# Patient Record
Sex: MALE
[De-identification: names, ages, dates, MRNs, and addresses within clinical notes are randomized per-mention and may not be internally consistent; named-entity substitution may affect disease eponyms.]

---

## 2018-11-13 ENCOUNTER — HOSPITAL ENCOUNTER (OUTPATIENT)
Dept: HOSPITAL 14 - H.ER | Age: 78
Setting detail: OBSERVATION
LOS: 1 days | Discharge: HOME | End: 2018-11-14
Attending: GENERAL ACUTE CARE HOSPITAL | Admitting: GENERAL ACUTE CARE HOSPITAL
Payer: MEDICARE

## 2018-11-13 DIAGNOSIS — E87.5: ICD-10-CM

## 2018-11-13 DIAGNOSIS — F41.9: ICD-10-CM

## 2018-11-13 DIAGNOSIS — R55: Primary | ICD-10-CM

## 2018-11-13 DIAGNOSIS — S00.01XA: ICD-10-CM

## 2018-11-13 DIAGNOSIS — I10: ICD-10-CM

## 2018-11-13 DIAGNOSIS — Z87.891: ICD-10-CM

## 2018-11-13 DIAGNOSIS — N32.81: ICD-10-CM

## 2018-11-13 DIAGNOSIS — E11.51: ICD-10-CM

## 2018-11-13 DIAGNOSIS — Z23: ICD-10-CM

## 2018-11-13 DIAGNOSIS — S01.01XA: ICD-10-CM
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
## 2018-11-13 DIAGNOSIS — N40.1: ICD-10-CM

## 2018-11-13 LAB
ALBUMIN SERPL-MCNC: 4.5 G/DL (ref 3.5–5)
ALBUMIN/GLOB SERPL: 1.2 {RATIO} (ref 1–2.1)
ALT SERPL-CCNC: 17 U/L (ref 21–72)
APTT BLD: 26 SECONDS (ref 25.6–37.1)
AST SERPL-CCNC: 25 U/L (ref 17–59)
BASOPHILS # BLD AUTO: 0 K/UL (ref 0–0.2)
BASOPHILS NFR BLD: 0.4 % (ref 0–2)
BILIRUB UR-MCNC: NEGATIVE MG/DL
BUN SERPL-MCNC: 22 MG/DL (ref 9–20)
CALCIUM SERPL-MCNC: 9.4 MG/DL (ref 8.4–10.2)
COLOR UR: YELLOW
EOSINOPHIL # BLD AUTO: 0.4 K/UL (ref 0–0.7)
EOSINOPHIL NFR BLD: 9.6 % (ref 0–4)
ERYTHROCYTE [DISTWIDTH] IN BLOOD BY AUTOMATED COUNT: 14.6 % (ref 11.5–14.5)
GFR NON-AFRICAN AMERICAN: 59
GLUCOSE UR STRIP-MCNC: (no result) MG/DL
HGB BLD-MCNC: 15.2 G/DL (ref 12–18)
INR PPP: 1
LEUKOCYTE ESTERASE UR-ACNC: (no result) LEU/UL
LYMPHOCYTES # BLD AUTO: 1.6 K/UL (ref 1–4.3)
LYMPHOCYTES NFR BLD AUTO: 37.6 % (ref 20–40)
MCH RBC QN AUTO: 31.5 PG (ref 27–31)
MCHC RBC AUTO-ENTMCNC: 33 G/DL (ref 33–37)
MCV RBC AUTO: 95.3 FL (ref 80–94)
MONOCYTES # BLD: 0.5 K/UL (ref 0–0.8)
MONOCYTES NFR BLD: 10.9 % (ref 0–10)
NEUTROPHILS # BLD: 1.8 K/UL (ref 1.8–7)
NEUTROPHILS NFR BLD AUTO: 41.5 % (ref 50–75)
NRBC BLD AUTO-RTO: 0.2 % (ref 0–0)
PH UR STRIP: 7 [PH] (ref 5–8)
PLATELET # BLD: 174 K/UL (ref 130–400)
PMV BLD AUTO: 7.9 FL (ref 7.2–11.7)
PROT UR STRIP-MCNC: NEGATIVE MG/DL
PROTHROMBIN TIME: 11.2 SECONDS (ref 9.8–13.1)
RBC # BLD AUTO: 4.82 MIL/UL (ref 4.4–5.9)
RBC # UR STRIP: NEGATIVE /UL
SP GR UR STRIP: 1.01 (ref 1–1.03)
URINE CLARITY: CLEAR
UROBILINOGEN UR-MCNC: (no result) MG/DL (ref 0.2–1)
WBC # BLD AUTO: 4.3 K/UL (ref 4.8–10.8)

## 2018-11-13 PROCEDURE — 71045 X-RAY EXAM CHEST 1 VIEW: CPT

## 2018-11-13 PROCEDURE — 80361 OPIATES 1 OR MORE: CPT

## 2018-11-13 PROCEDURE — 82948 REAGENT STRIP/BLOOD GLUCOSE: CPT

## 2018-11-13 PROCEDURE — 36415 COLL VENOUS BLD VENIPUNCTURE: CPT

## 2018-11-13 PROCEDURE — 85730 THROMBOPLASTIN TIME PARTIAL: CPT

## 2018-11-13 PROCEDURE — 80358 DRUG SCREENING METHADONE: CPT

## 2018-11-13 PROCEDURE — 80345 DRUG SCREENING BARBITURATES: CPT

## 2018-11-13 PROCEDURE — 83992 ASSAY FOR PHENCYCLIDINE: CPT

## 2018-11-13 PROCEDURE — 84484 ASSAY OF TROPONIN QUANT: CPT

## 2018-11-13 PROCEDURE — 96372 THER/PROPH/DIAG INJ SC/IM: CPT

## 2018-11-13 PROCEDURE — 80349 CANNABINOIDS NATURAL: CPT

## 2018-11-13 PROCEDURE — 93005 ELECTROCARDIOGRAM TRACING: CPT

## 2018-11-13 PROCEDURE — 70450 CT HEAD/BRAIN W/O DYE: CPT

## 2018-11-13 PROCEDURE — 80324 DRUG SCREEN AMPHETAMINES 1/2: CPT

## 2018-11-13 PROCEDURE — 85610 PROTHROMBIN TIME: CPT

## 2018-11-13 PROCEDURE — 85025 COMPLETE CBC W/AUTO DIFF WBC: CPT

## 2018-11-13 PROCEDURE — 80053 COMPREHEN METABOLIC PANEL: CPT

## 2018-11-13 PROCEDURE — 93880 EXTRACRANIAL BILAT STUDY: CPT

## 2018-11-13 PROCEDURE — 80320 DRUG SCREEN QUANTALCOHOLS: CPT

## 2018-11-13 PROCEDURE — 72125 CT NECK SPINE W/O DYE: CPT

## 2018-11-13 PROCEDURE — 90471 IMMUNIZATION ADMIN: CPT

## 2018-11-13 PROCEDURE — 80353 DRUG SCREENING COCAINE: CPT

## 2018-11-13 PROCEDURE — 99285 EMERGENCY DEPT VISIT HI MDM: CPT

## 2018-11-13 PROCEDURE — 93306 TTE W/DOPPLER COMPLETE: CPT

## 2018-11-13 PROCEDURE — 80346 BENZODIAZEPINES1-12: CPT

## 2018-11-13 PROCEDURE — 81003 URINALYSIS AUTO W/O SCOPE: CPT

## 2018-11-13 PROCEDURE — 90715 TDAP VACCINE 7 YRS/> IM: CPT

## 2018-11-13 RX ADMIN — GLIPIZIDE SCH MG: 10 TABLET, FILM COATED, EXTENDED RELEASE ORAL at 20:40

## 2018-11-13 RX ADMIN — CILOSTAZOL SCH MG: 100 TABLET ORAL at 20:46

## 2018-11-13 RX ADMIN — INSULIN LISPRO SCH UNITS: 100 INJECTION, SOLUTION INTRAVENOUS; SUBCUTANEOUS at 22:27

## 2018-11-13 NOTE — CT
Date of service: 



11/13/2018



PROCEDURE:  CT Cervical Spine without contrast



HISTORY:

trauma r/o fx



COMPARISON:

None available.



TECHNIQUE:

Axial computed tomography images were obtained of the cervical spine 

without the use of intravenous contrast. Coronal and sagittal 

reformatted images were created and reviewed.



Radiation dose:



Total exam DLP = 362.57 mGy-cm.



This CT exam was performed using one or more of the following dose 

reduction techniques: Automated exposure control, adjustment of the 

mA and/or kV according to patient size, and/or use of iterative 

reconstruction technique.



FINDINGS:



VERTEBRAE:

No fracture. Normal alignment. No destructive bony lesion.



Severe diffuse idiopathic skeletal hyperostosis (DISH) affecting the 

entire cervical spine.



DISCS/SPINAL CANAL/NEURAL FORAMINA:

No significant central canal or neural foraminal stenosis. 

Hyperostotic changes are seen at multiple levels as are disc 

degenerative changes primarily disc space narrowing.



PARASPINAL SOFT TISSUES:

Unremarkable. 



OTHER FINDINGS:

None.



IMPRESSION:

No acute findings related to/accounting  for the clinical 

presentation.



Additional benign and/or incidental findings described above.

## 2018-11-13 NOTE — ED PDOC
HPI: Trauma/Fall





- HPI


Time Seen by Provider: 11/13/18 15:00


Chief Complaint (Nursing): Trauma


Chief Complaint (Provider): Trauma


History Per: Patient, Family,  ([id number])


History/Exam Limitations: clinical condition


Onset/Duration Of Symptoms: Other (PTA)


Location Of Injury: Right: Head


Additional Complaint(s): 





78 years old male with history of hypertension and diabetes brought to ER by EMS

for evaluation of a fall with likely syncopal episode prior to arrival. Patient 

reports he fell on his way to his doctor's office but he is amnestic to all 

events, unable to recall if the incident happened while he was going to his 

doctor's office or if he was on his way to home. In ER, patient is awake and 

alert. Family arrived later and confirmed he is amnestic to all events, unsure 

if he had a mechanical fall or a syncopal episode. Patient has visible right 

sided head trauma and denies headaches, focal weakness, nausea or change in 

vision. He states he is compliant on diabetes medication.





PMD: Sabas Recinos


Discussed w Dr Recinos, patient never made it to the office today. 





NIHSS Stroke Scale





- Date/Time Evaluation Performed


Date Performed: 11/13/18


Time Performed: 15:05


When Was NIHSS Performed: Baseline





- How Severe is the Stroke


Level of Consciousness: 0=Alert


LOC to Questions: 1=One correct


LOC to commands: 0=Obeys both correctly


Best Gaze: 0=Normal


Visual: 0=No visual loss


Facial: 0=Normal


Motor Arm - Left: 0=No drift


Motor Arm - Right: 0=No drift


Motor Leg - Left: 0=No drift


Motor Leg - Right: 0=No drift


Limb Ataxia: 0=Absent


Sensory: 0=Normal


Best Language: 0=No aphasia


Dysarthia: 0=Normal articulation


Extinction & Inattention (Neglect): 0=Normal, no object


Score: 1





rTPA Inclusion/Exclusion





- Refusal of Treatment


Patient Refused Treatment: No





- Inclusion Criteria for Altepase


Patient is 18 years or Older: Yes


The Clinical Diagnosis of Ischemic Stroke That is Causing a Potentially 

Disabling Neurological Deficit: No


Time of Onset is Well Established to be Less Than 270 Minute Before Treatment 

Would Begin: No


Risk/Benefit Discussed With Patient/Family Member Present: No





- Warning to TPA With Conditions


Condition: Stroke Serevity Too Mild (head trauma with scalp hematoma)





Past Medical History


Reviewed: Historical Data, Nursing Documentation, Vital Signs


Vital Signs: 





                                Last Vital Signs











Temp  98.2 F   11/13/18 14:51


 


Pulse  65   11/13/18 14:51


 


Resp  18   11/13/18 14:51


 


BP  153/68 H  11/13/18 14:51


 


Pulse Ox  98   11/13/18 14:51














- Medical History


PMH: Anxiety, Diabetes, HTN





- Surgical History


Surgical History: No Surg Hx





- Family History


Family History: States: Unknown Family Hx





- Social History


Current smoker - smoking cessation education provided: No


Alcohol: None


Drugs: Denies





- Home Medications


Home Medications: 


                                Ambulatory Orders











 Medication  Instructions  Recorded


 


RX: Aspirin [Ecotrin] 81 mg PO DAILY 11/13/18


 


RX: Carvedilol [Coreg] 25 mg PO Q12 11/13/18


 


RX: Cilostazol [Pletal] 100 mg PO Q12 11/13/18


 


RX: Enalapril Maleate [Vasotec] 10 mg PO DAILY 11/13/18


 


RX: Finasteride [Proscar] 5 mg PO DAILY 11/13/18


 


RX: Furosemide [Lasix] 20 mg PO DAILY 11/13/18


 


RX: Glimepiride [amaRYL] 4 mg PO BID 11/13/18


 


RX: Insulin Glargine, Recombina 40 unit SC DAILY 11/13/18





[Lantus]  


 


RX: Sitagliptin Phos/Metformin HCl 1 tab PO BID 11/13/18





[Janumet 50-1,000 mg Tablet]  


 


RX: Solifenacin Succinate 5 mg PO DAILY 11/13/18





[Vesicare]  


 


RX: Temazepam [Restoril] 15 mg PO HS PRN 11/13/18


 


RX: Verapamil HCl [Verapamil Sr] 240 mg PO DAILY 11/13/18














- Allergies


Allergies/Adverse Reactions: 


                                    Allergies











Allergy/AdvReac Type Severity Reaction Status Date / Time


 


No Known Allergies Allergy   Verified 11/13/18 14:54














Review of Systems


Review Of Systems: ROS cannot be obtained secondary to pt's inabilty to answer 

questions.





Physical Exam





- Reviewed


Nursing Documentation Reviewed: Yes


Vital Signs Reviewed: Yes





- Physical Exam


Appears: Positive for: Non-toxic, No Acute Distress


Head Exam: Positive for: NORMOCEPHALIC.  Negative for: ATRAUMATIC (8 cm hematoma

to right scalp with 2 cm abrasion.)


Skin: Positive for: Normal Color, Warm, Dry


Neck: Positive for: Normal (no tenderness), Painless ROM, Supple


Cardiovascular/Chest: Positive for: Regular Rate, Rhythm.  Negative for: Murmur


Respiratory: Positive for: Normal Breath Sounds.  Negative for: Wheezing


Gastrointestinal/Abdominal: Positive for: Normal Exam, Soft.  Negative for: 

Tenderness


Neurologic/Psych: Positive for: Alert, Oriented, Other (Speech clear in Portuguese.

Strength equal semteric 5/5)





- Laboratory Results


Result Diagrams: 


                                 11/13/18 15:27





                                 11/13/18 15:27





- ECG


ECG: Positive for: Interpreted By Me


ECG Rhythm: Positive for: Sinus Rhythm, Nonspecific Changes


Rate: 69


O2 Sat by Pulse Oximetry: 98 (RA)


Pulse Ox Interpretation: Normal





Medical Decision Making


Medical Decision Making: 





Time: 1514


Initial plan:


--Workup for fall or syncope with visible head trauma and evidence of 

concussion.


--CT cervical spine


--CT head w/o contrast


--Chest x-ray


--Tetanus 0.5 ml IM


--Urinalysis


--EKG





1538


CXR FINDINGS:





LUNGS:


The lungs are well inflated and clear.





PLEURA:


No pleural effusions or pneumothorax. 





CARDIOVASCULAR:


The heart is normal in size.  No aortic atherosclerotic calcification present. 





OSSEOUS STRUCTURES:


Within normal limits for the patient's age.





VISUALIZED UPPER ABDOMEN:


Normal.





OTHER FINDINGS:


None.





IMPRESSION:


No active pulmonary disease.








-----------





labs and EKG reviewed by writer





Patient remains w periods of confusion and not at baseline per daughter, to plac

e Obs for AMS and head trauma.


Dr Recinos aware, admit Obs hospitalist











-----------------------

--------------------------------------------------------------


Scribe Attestation:


Documented by Karon Haney, acting as a scribe for Bjorn Peoples MD.





Provider Scribe Attestation:


All medical record entries made by the Scribe were at my direction and 

personally dictated by me. I have reviewed the chart and agree that the record 

accurately reflects my personal performance of the history, physical exam, 

medical decision making, and the department course for this patient. I have also

personally directed, reviewed, and agree with the discharge instructions and 

disposition.





Disposition





- Clinical Impression


Clinical Impression: 


 Syncope, Head injury, Concussion








- Patient ED Disposition


Is Patient to be Admitted: Yes


Counseled Patient/Family Regarding: Studies Performed, Diagnosis, Need For 

Followup





- Disposition


Disposition Time: 17:15


Condition: STABLE





- Pt Status Changed To:


Hospital Disposition Of: Observation





- POA


Present On Arrival: Falls Or Trauma

## 2018-11-13 NOTE — RAD
Date of service: 



11/13/2018



HISTORY:

 SOB 



COMPARISON:

No prior. 



FINDINGS:



LUNGS:

The lungs are well inflated and clear.



PLEURA:

No pleural effusions or pneumothorax. 



CARDIOVASCULAR:

The heart is normal in size.  No aortic atherosclerotic calcification 

present. 



OSSEOUS STRUCTURES:

Within normal limits for the patient's age.



VISUALIZED UPPER ABDOMEN:

Normal.



OTHER FINDINGS:

None.



IMPRESSION:

No active pulmonary disease.

## 2018-11-13 NOTE — CT
Date of service: 



11/13/2018



PROCEDURE:  CT HEAD WITHOUT CONTRAST.



HISTORY:

r/o ICH



COMPARISON:

The



TECHNIQUE:

Axial computed tomography images were obtained through the head/brain 

without intravenous contrast.  



Supplemental Coronal and Sagittal projections created and reviewed.



Radiation dose:



Total exam DLP = <inf_radiation_dlp> mGy-cm.



This CT exam was performed using one or more of the following dose 

reduction techniques: Automated exposure control, adjustment of the 

mA and/or kV according to patient size, and/or use of iterative 

reconstruction technique.



FINDINGS:



HEMORRHAGE:

No intracranial hemorrhage. 



BRAIN:

No mass effect or edema.  Cortical atrophy and chronic microvascular 

ischemic change. 



VENTRICLES:

Unremarkable. No hydrocephalus. 



CALVARIUM:

Unremarkable.



PARANASAL SINUSES:

Unremarkable as visualized. No significant inflammatory changes.



MASTOID AIR CELLS:

Unremarkable as visualized. No inflammatory changes.



OTHER FINDINGS:

Larger right parietal scalp contusion without underlying calvarial or 

intracranial abnormality. 



IMPRESSION:

Extracranial scalp contusion.



No acute intracranial abnormalities. No significant findings to 

account for the clinical presentation.
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
## 2018-11-14 VITALS — RESPIRATION RATE: 20 BRPM

## 2018-11-14 VITALS — SYSTOLIC BLOOD PRESSURE: 121 MMHG | DIASTOLIC BLOOD PRESSURE: 65 MMHG | TEMPERATURE: 97.2 F

## 2018-11-14 RX ADMIN — INSULIN LISPRO SCH UNITS: 100 INJECTION, SOLUTION INTRAVENOUS; SUBCUTANEOUS at 13:13

## 2018-11-14 RX ADMIN — CILOSTAZOL SCH MG: 100 TABLET ORAL at 13:10

## 2018-11-14 RX ADMIN — GLIPIZIDE SCH MG: 10 TABLET, FILM COATED, EXTENDED RELEASE ORAL at 13:14

## 2018-11-14 NOTE — CARD
--------------- APPROVED REPORT --------------





Date of service: 11/14/2018



EKG Measurement

Heart [id number]

[place] [id number]

[id number]

[id number]

[id number]



<Conclusion>

Normal sinus rhythm

Normal ECG

## 2018-11-14 NOTE — CARD
--------------- APPROVED REPORT --------------





Date of service: 11/14/2018



EXAM: Two-dimensional and M-mode echocardiogram with Doppler and 

color Doppler.



Other Information 

Quality : AverageRhythm : NSR



INDICATION

Syncope 



2D DIMENSIONS 

IVSd1.22   (0.7-1.1cm)LVDd4.12   (3.9-5.9cm)

LVOT Diameter2.22   (1.8-2.4cm)PWd1.12   (0.7-1.1cm)

IVSs1.39   (0.8-1.2cm)LVDs2.50   (2.5-4.0cm)

FS (%) 39.3   %PWs1.35   (0.8-1.2cm)



M-Mode DIMENSIONS 

Left Atrium (MM)5.13   (2.5-4.0cm)IVSd0.87   (0.7-1.1cm)

Aortic Root3.46   (2.2-3.7cm)LVDd6.29   (4.0-5.6cm)

Aortic Cusp Exc.1.96   (1.5-2.0cm)PWd0.94   (0.7-1.1cm)

IVSs1.75   cmFS (%) 37   %

LVDs3.98   (2.0-3.8cm)PWs1.99   cm



Aortic Valve

AoV Peak Inbpmvxr192.2cm/sAoV VTI20.7cmAO Peak GR.6mmHg

LVOT Peak Eiwltdkh48.0cm/sLVOT VTI16.53cmAO Mean GR.3mmHg

OSWALD (VMAX)1.99sm3OQT (VTI)1.58cm2



Mitral Valve

MV E Nglwfune63.2cm/sMV DECEL [id number] A Vfuhsevd88.7cm/s

MV YLQ33giU/A ratio0.8MVA (PHT)2.75cm2



TDI

Lateral E' Peak V4.56cm/sMedial E' Peak V5.38cm/sE/Lateral E'11.2

E/Medial E'9.5



 LEFT VENTRICLE 

The left ventricle is normal size.

There is mild concentric left ventricular hypertrophy.

The left ventricular systolic function is normal.

The estimated ejection fraction is 55-60%

No regional wall motion abnormalities noted..

Transmitral Doppler flow pattern is Grade I-abnormal relaxation 

pattern.

No left ventricle thrombus noted on this study.

There is no ventricular septal defect visualized.

There is no left ventricular aneurysm.

There is no mass noted in the left ventricle.



 RIGHT VENTRICLE 

The right ventricle is normal size.

There is normal right ventricular wall thickness.

The right ventricular systolic function is normal.



 ATRIA 

The left atrium is moderately dilated.

The right atrium size is normal.



 AORTIC VALVE 

The aortic valve is normal in structure.

No aortic regurgitation is present.

There is no aortic valvular stenosis.

There is no aortic valvular vegetation.



 MITRAL VALVE 

The mitral valve is normal in structure.

There is no evidence of mitral valve prolapse.

There is no mitral valve stenosis.

There is no mitral valve regurgitation noted.



 TRICUSPID VALVE 

The tricuspid valve is normal in structure.

There is no significant tricuspid valve regurgitation noted.

There is no tricuspid valve prolapse or vegetation.

There is no tricuspid valve stenosis.



 PULMONIC VALVE 

The pulmonary valve is normal in structure.

There is no pulmonic valvular regurgitation.

There is no pulmonic valvular stenosis.



 GREAT VESSELS 

The aortic root is normal in size.

The ascending aorta is normal in size.

The pulmonary artery is normal.

The IVC is not visualized



 PERICARDIAL EFFUSION 

There is no pericardial effusion.

There is no pleural effusion.



<Conclusion>

There is mild concentric left ventricular hypertrophy.

The estimated ejection fraction is 55-60%

Transmitral Doppler flow pattern is Grade I-abnormal relaxation 

pattern.

The left atrium is moderately dilated.

There is no significant tricuspid valve regurgitation noted.

## 2018-11-14 NOTE — CARD
--------------- APPROVED REPORT --------------





Date of service: 11/13/2018



EKG Measurement

Heart [id number]

HI [id number]

[id number]

[id number]

[id number]



<Conclusion>

Normal sinus rhythm

Nonspecific ST abnormality

Abnormal ECG

## 2018-11-14 NOTE — CP.PCM.PN
<Sarah Metcalf - Last Filed: 11/14/18 12:38>





Subjective





- Date & Time of Evaluation


Date of Evaluation: 11/14/18


Time of Evaluation: 11:41





- Subjective


Subjective: 





79 yo male patient seen and examined at bedside. Patient is resting comfortably 

in bed and in NAD. No acute events overnight, no further episodes of syncope. He

denies any pain to abrasion site.  Denies N/V/F/SOB/CP/Chills, denies seizures, 

denies dizziness. 








Objective





- Vital Signs/Intake and Output


Vital Signs (last 24 hours): 


                                        











Temp Pulse Resp BP Pulse Ox


 


 97.7 F   69   20   129/77   94 L


 


 11/14/18 08:20  11/14/18 08:20  11/14/18 08:20  11/14/18 08:20  11/14/18 08:20











- Medications


Medications: 


                               Current Medications





Aspirin (Ecotrin)  81 mg PO DAILY [facility]


Bacitracin (Bacitracin Oint)  1 applic TOP TID [facility]


   Last Admin: 11/13/18 20:42 Dose:  1 applic


Carvedilol (Coreg)  25 mg PO Q12 [facility]


   Last Admin: 11/13/18 20:40 Dose:  25 mg


Cilostazol (Pletal)  100 mg PO Q12 [facility]


   Last Admin: 11/13/18 20:46 Dose:  100 mg


Dextrose (Dextrose 50% Inj)  0 ml IV STAT PRN; Protocol


   PRN Reason: Hypoglycemia Protocol


Dextrose (Glutose 15)  0 gm PO ONCE PRN; Protocol


   PRN Reason: Hypoglycemia Protocol


Enalapril Maleate (Vasotec)  10 mg PO DAILY [facility]


Finasteride (Proscar)  5 mg PO DAILY [facility]


Furosemide (Lasix)  20 mg PO DAILY [facility]


Glipizide (Glucotrol Xl)  10 mg PO BIDWM [facility]


   Last Admin: 11/13/18 20:40 Dose:  10 mg


Glucagon (Glucagen Diagnostic Kit)  0 mg IM STAT PRN; Protocol


   PRN Reason: Hypoglycemia Protocol


Home Med (Solifenacin Succinate [Vesicare])  5 mg PO DAILY [facility]


Insulin Detemir (Levemir)  40 units SC DAILY [facility]


Insulin Human [facility]  [facility]; Protocol


   Last Admin: 11/13/18 22:27 Dose:  3 units


Meclizine HCl (Antivert)  25 mg PO Q8 PRN


   PRN Reason: Dizziness


Metformin HCl (Glucophage)  1,000 mg PO BID [facility]


   Last Admin: 11/13/18 20:39 Dose:  1,000 mg


Sitagliptin Phosphate (Januvia)  100 mg PO DAILY [facility]


Temazepam (Restoril)  15 mg PO HS PRN


   PRN Reason: Insomnia


Verapamil HCl (Calan Sr Capsule)  240 mg PO DAILY [facility]











- Labs


Labs: 


                                        





                                 11/13/18 15:27 





                                 11/13/18 15:27 





                                        











PT  11.2 Seconds (9.8-13.1)   11/13/18  15:27    


 


INR  1.0   11/13/18  15:27    


 


APTT  26.0 Seconds (25.6-37.1)   11/13/18  15:27    














- Constitutional


Appears: Well, No Acute Distress





- Head Exam


Additional comments: 





Superficial abrasion with mild sero-sanginous drainage and ecchymosis noted to 

the R lateral aspect of scalp





- Eye Exam


Eye Exam: PERRL


Pupil Exam: NORMAL ACCOMODATION, PERRL


Additional comments: 





Ptosis appreciated to L upper eyelid 





- Neck Exam


Neck Exam: Normal Inspection





- Respiratory Exam


Respiratory Exam: Clear to Ausculation Bilateral, NORMAL BREATHING PATTERN





- Cardiovascular Exam


Cardiovascular Exam: REGULAR RHYTHM





- GI/Abdominal Exam


GI & Abdominal Exam: Soft





- Extremities Exam


Extremities Exam: Normal Capillary Refill, Normal Inspection





- Back Exam


Back Exam: NORMAL INSPECTION





- Neurological Exam


Neurological Exam: Alert, Awake





- Psychiatric Exam


Psychiatric exam: Normal Affect, Normal Mood





- Skin


Skin Exam: Warm





Assessment and Plan





- Assessment and Plan (Free Text)


Assessment: 


 79 yo male with PMH of hypertension, DMII, PVD, overactive bladder admitted to 

the [facility] for evaluation and treatment of syncopal episode 





Plan: 





1) Syncopal episode, unknown etiology 


- r/o vasovagal vs cardiac vs neuro vs dehydration vs hypoglycemia 


- Head CT and Cervical CT: No acute changes 


- ECG (11/13): Normal sinus rhythm, non-specific ST abnormality


- Follow up Echo 


- Carotid U/S; No evidence of hemodynamically significant stenosis in the 

internal carotid arteries by peak systolic velocity criteria. Patent b/l 

vertebral arteries with antegrade flow 





2) Right scalp parietal lobe superficial abrasion


- CT head: no acute findings 


- C/w Abrasion care: Bacitracin 





3) Hypertension


- Chronic 


- C/w Home medications: Coreg, Enalapril, Verapamil 





4) DM-II 


- Chronic 


- C/w Home medications


- Lispro/medium dose sliding scale


- Hypoglycemic protocol


- AccuChecks ACHS





5) Hyperkalemia 


- Likely due to chronic conditions and medications use 


- F/U am labs





6) Overactive bladder/BPH


- C/w home medications: Solifenacin and Finasteride 





7) PVD


- C/w Aspirin and Cilostazol 





8) DVT ppx


- SCD for now 








<Gerardo Raya D - Last Filed: 11/14/18 16:07>





Objective





- Vital Signs/Intake and Output


Vital Signs (last 24 hours): 


                                        











Temp Pulse Resp BP Pulse Ox


 


 97.2 F L  81   20   121/65   99 


 


 11/14/18 12:06  11/14/18 12:06  11/14/18 12:06  11/14/18 13:13  11/14/18 12:06











- Medications


Medications: 


                               Current Medications





Aspirin (Ecotrin)  81 mg PO DAILY [facility]


Bacitracin (Bacitracin Oint)  1 applic TOP TID [facility]


   Last Admin: 11/13/18 20:42 Dose:  1 applic


Carvedilol (Coreg)  25 mg PO Q12 [facility]


   Last Admin: 11/14/18 13:14 Dose:  25 mg


Cilostazol (Pletal)  100 mg PO Q12 [facility]


   Last Admin: 11/14/18 13:10 Dose:  100 mg


Dextrose (Dextrose 50% Inj)  0 ml IV STAT PRN; Protocol


   PRN Reason: Hypoglycemia Protocol


Dextrose (Glutose 15)  0 gm PO ONCE PRN; Protocol


   PRN Reason: Hypoglycemia Protocol


Enalapril Maleate (Vasotec)  10 mg PO DAILY [facility]


   Last Admin: 11/14/18 13:14 Dose:  10 mg


Finasteride (Proscar)  5 mg PO DAILY [facility]


   Last Admin: 11/14/18 13:15 Dose:  5 mg


Furosemide (Lasix)  20 mg PO DAILY [facility]


   Last Admin: 11/14/18 13:13 Dose:  20 mg


Glipizide (Glucotrol Xl)  10 mg PO BIDWM [facility]


   Last Admin: 11/14/18 13:14 Dose:  10 mg


Glucagon (Glucagen Diagnostic Kit)  0 mg IM STAT PRN; Protocol


   PRN Reason: Hypoglycemia Protocol


Home Med (Solifenacin Succinate [Vesicare])  5 mg PO DAILY [facility]


Insulin Detemir (Levemir)  40 units SC DAILY [facility]


   Last Admin: 11/14/18 13:11 Dose:  40 unit


Insulin Human Lispro (Humalog)  0 units SC ACHS [facility]; Protocol


   Last Admin: 11/14/18 13:13 Dose:  4 units


Meclizine HCl (Antivert)  25 mg PO Q8 PRN


   PRN Reason: Dizziness


Metformin HCl (Glucophage)  1,000 mg PO BID [facility]


   Last Admin: 11/14/18 13:14 Dose:  1,000 mg


Sitagliptin Phosphate (Januvia)  100 mg PO DAILY [facility]


   Last Admin: 11/14/18 13:12 Dose:  100 mg


Temazepam (Restoril)  15 mg PO HS PRN


   PRN Reason: Insomnia


Verapamil HCl (Calan Sr Capsule)  240 mg PO DAILY [facility]











- Labs


Labs: 


                                        





                                 11/13/18 15:27 





                                 11/13/18 15:27 





                                        











PT  11.2 Seconds (9.8-13.1)   11/13/18  15:27    


 


INR  1.0   11/13/18  15:27    


 


APTT  26.0 Seconds (25.6-37.1)   11/13/18  15:27    














Attending/Attestation





- Attestation


I have personally seen and examined this patient.: Yes


I have fully participated in the care of the patient.: Yes


I have reviewed all pertinent clinical information, including history, physical 

exam and plan: Yes


Notes (Text): 





11/14/18 16:06


Patient seen and examined. Patient's feeling better and appeared better but 

still with slight wheezing. Plan for discharge tomorrow barring untoward events.

## 2018-11-14 NOTE — CP.PCM.DIS
<Sarah Metcalf - Last Filed: 11/14/18 14:40>





Provider





- Provider


Date of Admission: 


11/13/18 18:00





Attending physician: 


Gerardo Raya MD





Time Spent in preparation of Discharge (in minutes): 30





Diagnosis





- Discharge Diagnosis


(1) Syncope


Status: Acute   





Hospital Course





- Lab Results


Lab Results: 


                             Most Recent Lab Values











WBC  4.3 K/uL (4.8-10.8)  L  11/13/18  15:27    


 


RBC  4.82 Mil/uL (4.40-5.90)   11/13/18  15:27    


 


Hgb  15.2 g/dL (12.0-18.0)   11/13/18  15:27    


 


Hct  45.9 % (35.0-51.0)   11/13/18  15:27    


 


MCV  95.3 fl (80.0-94.0)  H  11/13/18  15:27    


 


MCH  31.5 pg (27.0-31.0)  H  11/13/18  15:27    


 


MCHC  33.0 g/dL (33.0-37.0)   11/13/18  15:27    


 


RDW  14.6 % (11.5-14.5)  H  11/13/18  15:27    


 


Plt Count  174 K/uL (130-400)   11/13/18  15:27    


 


MPV  7.9 fl (7.2-11.7)   11/13/18  15:27    


 


Neut % (Auto)  41.5 % (50.0-75.0)  L  11/13/18  15:27    


 


Lymph % (Auto)  37.6 % (20.0-40.0)   11/13/18  15:27    


 


Mono % (Auto)  10.9 % (0.0-10.0)  H  11/13/18  15:27    


 


Eos % (Auto)  9.6 % (0.0-4.0)  H  11/13/18  15:27    


 


Baso % (Auto)  0.4 % (0.0-2.0)   11/13/18  15:27    


 


Neut # (Auto)  1.8 K/uL (1.8-7.0)   11/13/18  15:27    


 


Lymph # (Auto)  1.6 K/uL (1.0-4.3)   11/13/18  15:27    


 


Mono # (Auto)  0.5 K/uL (0.0-0.8)   11/13/18  15:27    


 


Eos # (Auto)  0.4 K/uL (0.0-0.7)   11/13/18  15:27    


 


Baso # (Auto)  0.0 K/uL (0.0-0.2)   11/13/18  15:27    


 


PT  11.2 Seconds (9.8-13.1)   11/13/18  15:27    


 


INR  1.0   11/13/18  15:27    


 


APTT  26.0 Seconds (25.6-37.1)   11/13/18  15:27    


 


Sodium  137 mmol/l (132-148)   11/13/18  15:27    


 


Potassium  5.3 MMOL/L (3.6-5.0)  H  11/13/18  15:27    


 


Chloride  101 mmol/L ()   11/13/18  15:27    


 


Carbon Dioxide  25 mmol/L (22-30)   11/13/18  15:27    


 


Anion Gap  16  (10-20)   11/13/18  15:27    


 


BUN  22 mg/dl (9-20)  H  11/13/18  15:27    


 


Creatinine  1.2 mg/dl (0.8-1.5)   11/13/18  15:27    


 


Est GFR ( Amer)  > 60   11/13/18  15:27    


 


Est GFR (Non-Af Amer)  59   11/13/18  15:27    


 


POC Glucose (mg/dL)  288 mg/dL ()  H  11/14/18  11:45    


 


Random Glucose  275 mg/dL ()  H  11/13/18  15:27    


 


Calcium  9.4 mg/dL (8.4-10.2)   11/13/18  15:27    


 


Total Bilirubin  1.0 mg/dl (0.2-1.3)   11/13/18  15:27    


 


AST  25 U/L (17-59)   11/13/18  15:27    


 


ALT  17 U/L (21-72)  L  11/13/18  15:27    


 


Alkaline Phosphatase  103 U/L ()   11/13/18  15:27    


 


Troponin I  < 0.0120 ng/mL (0.00-0.120)   11/14/18  02:37    


 


Total Protein  8.2 G/DL (6.3-8.2)   11/13/18  15:27    


 


Albumin  4.5 g/dL (3.5-5.0)   11/13/18  15:27    


 


Globulin  3.7 gm/dL (2.2-3.9)   11/13/18  15:27    


 


Albumin/Globulin Ratio  1.2  (1.0-2.1)   11/13/18  15:27    


 


Urine Color  Yellow  (YELLOW)   11/13/18  18:58    


 


Urine Clarity  Clear  (Clear)   11/13/18  18:58    


 


Urine pH  7.0  (5.0-8.0)   11/13/18  18:58    


 


Ur Specific Gravity  1.012  (1.003-1.030)   11/13/18  18:58    


 


Urine Protein  Negative mg/dL (NEGATIVE)   11/13/18  18:58    


 


Urine Glucose (UA)  Neg mg/dL (Normal)   11/13/18  18:58    


 


Urine Ketones  Negative mg/dL (NEGATIVE)   11/13/18  18:58    


 


Urine Blood  Negative  (NEGATIVE)   11/13/18  18:58    


 


Urine Nitrate  Negative  (NEGATIVE)   11/13/18  18:58    


 


Urine Bilirubin  Negative  (NEGATIVE)   11/13/18  18:58    


 


Urine Urobilinogen  0.2-1.0 mg/dL (0.2-1.0)   11/13/18  18:58    


 


Ur Leukocyte Esterase  Neg Kerry/uL (Negative)   11/13/18  18:58    


 


Urine RBC (Auto)  2 /hpf (0-3)   11/13/18  18:58    


 


Urine Opiates Screen  Negative  (NEGATIVE)   11/13/18  18:58    


 


Urine Methadone Screen  Negative  (NEGATIVE)   11/13/18  18:58    


 


Ur Barbiturates Screen  Negative  (NEGATIVE)   11/13/18  18:58    


 


Ur Phencyclidine Scrn  Negative  (NEGATIVE)   11/13/18  18:58    


 


Ur Amphetamines Screen  Negative  (NEGATIVE)   11/13/18  18:58    


 


U Benzodiazepines Scrn  Negative  (NEGATIVE)   11/13/18  18:58    


 


U Oth Cocaine Metabols  Negative  (NEGATIVE)   11/13/18  18:58    


 


U Cannabinoids Screen  Negative  (NEGATIVE)   11/13/18  18:58    


 


Alcohol, Quantitative  < 10 mg/dl (0-10)   11/13/18  15:27    














- Hospital Course


Hospital Course: 


 79 yo male with PMH of hypertension, DMII, PVD, overactive bladder admitted to 

the [facility] for evaluation and treatment of syncopal episode 





Plan: 





1) Syncopal episode, unknown etiology 


- r/o vasovagal vs cardiac vs neuro vs dehydration vs hypoglycemia 


- Head CT and Cervical CT: No acute changes 


- ECG (11/13): Normal sinus rhythm, non-specific ST abnormality


- Echo; Mild concentric L ventricular hypertrophy. The estimated ejection 

fraction is 55-60%. Transmitral doppler flow pattern is Grade 1-abnormal 

relaxation pattern. The L atrium is moderately dilated. There is no significant 

tricuspid valve regurgitation noted. 


- Carotid U/S; No evidence of hemodynamically significant stenosis in the 

internal carotid arteries by peak systolic velocity criteria. Patent b/l 

vertebral arteries with antegrade flow 





2) Right scalp parietal lobe superficial abrasion


- CT head: no acute findings 


- C/w Abrasion care: Bacitracin 





79 yo male with PMH of hypertension, DMII, PVD, overactive bladder admitted to 

the [facility] for evaluation and treatment of syncopal episode with R scalp 

superficial abrasion. Upon admission, full syncope workup unremarkable; patient 

placed on tele monitor, glucose level monitored, no arrhythmas, no signs of 

symptoms of orthostatic blood pressure, serial troponins negative. Scalp 

laceration treated with local wound care. After an uneventful hospital course, 

patient stable for discharge home.  














- Date & Time of H&P


Date of H&P: 11/14/18


Time of H&P: 13:59





Discharge Exam





- Head Exam


Head Exam: NORMOCEPHALIC.  absent: ATRAUMATIC (8 cm hematoma to right scalp with

2 cm abrasion.)


Additional comments: 





Superficial abrasion with mild sero-sanginous drainage and ecchymosis noted to 

the R lateral aspect of scalp








- Eye Exam


Eye Exam: Normal appearance


Pupil Exam: NORMAL ACCOMODATION


Additional comments: 





Ptosis appreciated to L upper eyelid 





- Respiratory Exam


Respiratory Exam: NORMAL BREATHING PATTERN





- Cardiovascular Exam


Cardiovascular Exam: REGULAR RHYTHM





- GI/Abdominal Exam


GI & Abdominal Exam: Unremarkable





- Extremities Exam


Extremities exam: normal capillary refill, normal inspection





- Neurological Exam


Neurological exam: Alert, Oriented x3





- Psychiatric Exam


Psychiatric exam: Normal Affect, Normal Mood





- Skin


Skin Exam: Warm





Discharge Plan





- Follow Up Plan


Condition: STABLE


Disposition: HOME/ ROUTINE


Instructions:  Syncope (Fainting) (DC)


Additional Instructions: 


follow up with pmd in 1 week


Referrals: 


Sabas Recinos MD [Family Provider] - 





<Gerardo Raya - Last Filed: 11/14/18 16:18>





Provider





- Provider


Date of Admission: 


11/13/18 18:00





Attending physician: 


Gerardo Raya MD








Hospital Course





- Lab Results


Lab Results: 


                             Most Recent Lab Values











WBC  4.3 K/uL (4.8-10.8)  L  11/13/18  15:27    


 


RBC  4.82 Mil/uL (4.40-5.90)   11/13/18  15:27    


 


Hgb  15.2 g/dL (12.0-18.0)   11/13/18  15:27    


 


Hct  45.9 % (35.0-51.0)   11/13/18  15:27    


 


MCV  95.3 fl (80.0-94.0)  H  11/13/18  15:27    


 


MCH  31.5 pg (27.0-31.0)  H  11/13/18  15:27    


 


MCHC  33.0 g/dL (33.0-37.0)   11/13/18  15:27    


 


RDW  14.6 % (11.5-14.5)  H  11/13/18  15:27    


 


Plt Count  174 K/uL (130-400)   11/13/18  15:27    


 


MPV  7.9 fl (7.2-11.7)   11/13/18  15:27    


 


Neut % (Auto)  41.5 % (50.0-75.0)  L  11/13/18  15:27    


 


Lymph % (Auto)  37.6 % (20.0-40.0)   11/13/18  15:27    


 


Mono % (Auto)  10.9 % (0.0-10.0)  H  11/13/18  15:27    


 


Eos % (Auto)  9.6 % (0.0-4.0)  H  11/13/18  15:27    


 


Baso % (Auto)  0.4 % (0.0-2.0)   11/13/18  15:27    


 


Neut # (Auto)  1.8 K/uL (1.8-7.0)   11/13/18  15:27    


 


Lymph # (Auto)  1.6 K/uL (1.0-4.3)   11/13/18  15:27    


 


Mono # (Auto)  0.5 K/uL (0.0-0.8)   11/13/18  15:27    


 


Eos # (Auto)  0.4 K/uL (0.0-0.7)   11/13/18  15:27    


 


Baso # (Auto)  0.0 K/uL (0.0-0.2)   11/13/18  15:27    


 


PT  11.2 Seconds (9.8-13.1)   11/13/18  15:27    


 


INR  1.0   11/13/18  15:27    


 


APTT  26.0 Seconds (25.6-37.1)   11/13/18  15:27    


 


Sodium  137 mmol/l (132-148)   11/13/18  15:27    


 


Potassium  5.3 MMOL/L (3.6-5.0)  H  11/13/18  15:27    


 


Chloride  101 mmol/L ()   11/13/18  15:27    


 


Carbon Dioxide  25 mmol/L (22-30)   11/13/18  15:27    


 


Anion Gap  16  (10-20)   11/13/18  15:27    


 


BUN  22 mg/dl (9-20)  H  11/13/18  15:27    


 


Creatinine  1.2 mg/dl (0.8-1.5)   11/13/18  15:27    


 


Est GFR ( Amer)  > 60   11/13/18  15:27    


 


Est GFR (Non-Af Amer)  59   11/13/18  15:27    


 


POC Glucose (mg/dL)  288 mg/dL ()  H  11/14/18  11:45    


 


Random Glucose  275 mg/dL ()  H  11/13/18  15:27    


 


Calcium  9.4 mg/dL (8.4-10.2)   11/13/18  15:27    


 


Total Bilirubin  1.0 mg/dl (0.2-1.3)   11/13/18  15:27    


 


AST  25 U/L (17-59)   11/13/18  15:27    


 


ALT  17 U/L (21-72)  L  11/13/18  15:27    


 


Alkaline Phosphatase  103 U/L ()   11/13/18  15:27    


 


Troponin I  < 0.0120 ng/mL (0.00-0.120)   11/14/18  02:37    


 


Total Protein  8.2 G/DL (6.3-8.2)   11/13/18  15:27    


 


Albumin  4.5 g/dL (3.5-5.0)   11/13/18  15:27    


 


Globulin  3.7 gm/dL (2.2-3.9)   11/13/18  15:27    


 


Albumin/Globulin Ratio  1.2  (1.0-2.1)   11/13/18  15:27    


 


Urine Color  Yellow  (YELLOW)   11/13/18  18:58    


 


Urine Clarity  Clear  (Clear)   11/13/18  18:58    


 


Urine pH  7.0  (5.0-8.0)   11/13/18  18:58    


 


Ur Specific Gravity  1.012  (1.003-1.030)   11/13/18  18:58    


 


Urine Protein  Negative mg/dL (NEGATIVE)   11/13/18  18:58    


 


Urine Glucose (UA)  Neg mg/dL (Normal)   11/13/18  18:58    


 


Urine Ketones  Negative mg/dL (NEGATIVE)   11/13/18  18:58    


 


Urine Blood  Negative  (NEGATIVE)   11/13/18  18:58    


 


Urine Nitrate  Negative  (NEGATIVE)   11/13/18  18:58    


 


Urine Bilirubin  Negative  (NEGATIVE)   11/13/18  18:58    


 


Urine Urobilinogen  0.2-1.0 mg/dL (0.2-1.0)   11/13/18  18:58    


 


Ur Leukocyte Esterase  Neg Kerry/uL (Negative)   11/13/18  18:58    


 


Urine RBC (Auto)  2 /hpf (0-3)   11/13/18  18:58    


 


Urine Opiates Screen  Negative  (NEGATIVE)   11/13/18  18:58    


 


Urine Methadone Screen  Negative  (NEGATIVE)   11/13/18  18:58    


 


Ur Barbiturates Screen  Negative  (NEGATIVE)   11/13/18  18:58    


 


Ur Phencyclidine Scrn  Negative  (NEGATIVE)   11/13/18  18:58    


 


Ur Amphetamines Screen  Negative  (NEGATIVE)   11/13/18  18:58    


 


U Benzodiazepines Scrn  Negative  (NEGATIVE)   11/13/18  18:58    


 


U Oth Cocaine Metabols  Negative  (NEGATIVE)   11/13/18  18:58    


 


U Cannabinoids Screen  Negative  (NEGATIVE)   11/13/18  18:58    


 


Alcohol, Quantitative  < 10 mg/dl (0-10)   11/13/18  15:27    














Attending/Attestation





- Attestation


I have personally seen and examined this patient.: Yes


I have fully participated in the care of the patient.: Yes


I have reviewed all pertinent clinical information, including history, physical 

exam and plan: Yes


Notes (Text): 





11/14/18 16:16


Patient seen and examined with resident. Serial Troponins, EKGs, ECHO and CT 

scan of head did not have significant findings. Syncope probably secondary to 

medications and postural hypotension. Patient discharged in stable condition.

## 2018-11-14 NOTE — US
Date of service: 



11/13/2018



PROCEDURE:  Duplex ultrasound  of the carotid and vertebral arteries. 



HISTORY:

Syncope 



COMPARISON:

None available. 



TECHNIQUE:

Grayscale and duplex Doppler evaluation of the cervical carotid and 

vertebral arteries were performed. The common carotid, carotid 

bifurcations and cervical ICA and proximal ECA were evaluated.  The 

vertebral arteries were evaluated for gross patency and direction. 



FINDINGS:



RIGHT CAROTID ARTERIES:

Common Carotid Artery: Normal. Maximal flow velocity of 67.1 cm/s.



Carotid Bifurcation: Normal.



Internal Carotid Artery:Normal. Maximal flow velocity of 69.4 cm/s.



External Carotid Artery (proximal branches): Normal. Maximal flow 

velocity of 77.1 cm/s.



ICA/CCA Ratio: 1.2



LEFT CAROTID ARTERIES:

Common Carotid Artery: Normal. Maximal flow velocity of 72.4 cm/s.



Carotid Bifurcation: Normal.



Internal Carotid Artery:There are atherosclerotic calcifications in 

the proximal artery.  Maximal flow velocity of 82.0 cm/s.



External Carotid Artery (proximal branches): Normal. Maximal flow 

velocity of 74.9 cm/s.



ICA/CCA Ratio: 1.3



VERTEBRAL ARTERIES:

Right Vertebral Artery: Patent. Antegrade flow.



Left Vertebral Artery: Patent. Antegrade flow.



OTHER FINDINGS:

No atherosclerotic calcification present



IMPRESSION:

No evidence of hemodynamically significant stenosis in the internal 

carotid arteries by peak systolic velocity criteria.



Patent bilateral vertebral arteries with antegrade flow.

## 2018-11-15 VITALS — OXYGEN SATURATION: 98 % | HEART RATE: 69 BPM
